# Patient Record
Sex: FEMALE | Race: BLACK OR AFRICAN AMERICAN
[De-identification: names, ages, dates, MRNs, and addresses within clinical notes are randomized per-mention and may not be internally consistent; named-entity substitution may affect disease eponyms.]

---

## 2019-02-22 NOTE — PRE-PROCEDURE NOTE/ATTESTATION
Pre-Procedure Note/Attestation


Complete Prior to Procedure


Planned Procedure:  right


Procedure Narrative:


Cataract extraction With Intraocular Lens Implant Right Eye





Indications for Procedure


Pre-Operative Diagnosis:


Cortical Cataract Right Eye





Attestation


I attest that I discussed the nature of the procedure; its benefits; risks and 

complications; and alternatives (and the risks and benefits of such alternatives

), prior to the procedure, with the patient (or the patient's legal 

representative).





I attest that, if there was a reasonable possibility of needing a blood 

transfusion, the patient (or the patient's legal representative) was given the 

[facility] standardized written summary, pursuant 

to the Christiano Ryan Blood Safety Act ([place] Health and Safety Code # 1645, as 

amended).





I attest that I re-evaluated the patient just prior to the surgery and that 

there has been no change in the patient's H&P, except as documented below:











Herbert Watt MD Feb 22, 2019 14:45

## 2019-02-22 NOTE — OPTHALMOLOGY H&P
Ophthalmology H&P


H&P


Chief Complaint:  decreased vision in right eye





HPI


Vision Affects Ability to:  read, manage personal affairs


HPI Narrative


Blurry Vision





Exam


Visual Acuity:  OD20/100       OS20/40


Tension:  OD  14            OS 14


Eye Exam:  normal OU: external exam, palpebral fissure-width, marginal reflex 

distance, levator function, corneas, anterior chambers, lens - Cortical 

Cataract OU, fundus exam; findings: lens - Cortical Cataract OU, fundus exam





Assessment/Plan


Treatment Plan:  cataract extraction w/ lens implant


Goals of Treatment:  improvement of vision, enhance quality of life





Attestation


Attestation


The risks and benefits of the surgery as well as alternative procedures were 

explained to the patient in detail.











Herbert Watt MD Feb 22, 2019 14:50

## 2019-02-25 ENCOUNTER — HOSPITAL ENCOUNTER (OUTPATIENT)
Dept: HOSPITAL 72 - SUR | Age: 64
Discharge: HOME | End: 2019-02-25
Payer: MEDICARE

## 2019-02-25 VITALS — SYSTOLIC BLOOD PRESSURE: 118 MMHG | DIASTOLIC BLOOD PRESSURE: 73 MMHG

## 2019-02-25 VITALS — DIASTOLIC BLOOD PRESSURE: 63 MMHG | SYSTOLIC BLOOD PRESSURE: 114 MMHG

## 2019-02-25 VITALS — DIASTOLIC BLOOD PRESSURE: 62 MMHG | SYSTOLIC BLOOD PRESSURE: 114 MMHG

## 2019-02-25 VITALS — SYSTOLIC BLOOD PRESSURE: 134 MMHG | DIASTOLIC BLOOD PRESSURE: 76 MMHG

## 2019-02-25 VITALS — SYSTOLIC BLOOD PRESSURE: 119 MMHG | DIASTOLIC BLOOD PRESSURE: 66 MMHG

## 2019-02-25 VITALS — HEIGHT: 60 IN | BODY MASS INDEX: 20.62 KG/M2 | WEIGHT: 105 LBS

## 2019-02-25 VITALS — SYSTOLIC BLOOD PRESSURE: 128 MMHG | DIASTOLIC BLOOD PRESSURE: 65 MMHG

## 2019-02-25 VITALS — DIASTOLIC BLOOD PRESSURE: 71 MMHG | SYSTOLIC BLOOD PRESSURE: 120 MMHG

## 2019-02-25 VITALS — SYSTOLIC BLOOD PRESSURE: 125 MMHG | DIASTOLIC BLOOD PRESSURE: 63 MMHG

## 2019-02-25 VITALS — SYSTOLIC BLOOD PRESSURE: 118 MMHG | DIASTOLIC BLOOD PRESSURE: 64 MMHG

## 2019-02-25 DIAGNOSIS — K21.9: ICD-10-CM

## 2019-02-25 DIAGNOSIS — Z88.5: ICD-10-CM

## 2019-02-25 DIAGNOSIS — E78.5: ICD-10-CM

## 2019-02-25 DIAGNOSIS — I10: ICD-10-CM

## 2019-02-25 DIAGNOSIS — E11.9: ICD-10-CM

## 2019-02-25 DIAGNOSIS — Z88.0: ICD-10-CM

## 2019-02-25 DIAGNOSIS — E78.00: ICD-10-CM

## 2019-02-25 DIAGNOSIS — H25.011: Primary | ICD-10-CM

## 2019-02-25 DIAGNOSIS — I73.9: ICD-10-CM

## 2019-02-25 DIAGNOSIS — H91.93: ICD-10-CM

## 2019-02-25 PROCEDURE — 82962 GLUCOSE BLOOD TEST: CPT

## 2019-02-25 PROCEDURE — 66984 XCAPSL CTRC RMVL W/O ECP: CPT

## 2019-02-25 PROCEDURE — 94150 VITAL CAPACITY TEST: CPT

## 2019-02-25 RX ADMIN — CYCLOPENTOLATE HYDROCHLORIDE SCH DROP: 10 SOLUTION OPHTHALMIC at 10:24

## 2019-02-25 RX ADMIN — Medication SCH DROP: at 10:18

## 2019-02-25 RX ADMIN — Medication SCH DROP: at 10:30

## 2019-02-25 RX ADMIN — Medication SCH DROP: at 10:24

## 2019-02-25 RX ADMIN — TOBRAMYCIN SCH DROP: 3 SOLUTION OPHTHALMIC at 10:18

## 2019-02-25 RX ADMIN — PHENYLEPHRINE HYDROCHLORIDE SCH DROP: 100 SOLUTION/ DROPS OPHTHALMIC at 10:24

## 2019-02-25 RX ADMIN — DICLOFENAC SODIUM SCH DROP: 1 SOLUTION/ DROPS OPHTHALMIC at 10:18

## 2019-02-25 RX ADMIN — TOBRAMYCIN SCH DROP: 3 SOLUTION OPHTHALMIC at 10:30

## 2019-02-25 RX ADMIN — PHENYLEPHRINE HYDROCHLORIDE SCH DROP: 100 SOLUTION/ DROPS OPHTHALMIC at 10:30

## 2019-02-25 RX ADMIN — PHENYLEPHRINE HYDROCHLORIDE SCH DROP: 100 SOLUTION/ DROPS OPHTHALMIC at 10:18

## 2019-02-25 RX ADMIN — CYCLOPENTOLATE HYDROCHLORIDE SCH DROP: 10 SOLUTION OPHTHALMIC at 10:18

## 2019-02-25 RX ADMIN — DICLOFENAC SODIUM SCH DROP: 1 SOLUTION/ DROPS OPHTHALMIC at 10:24

## 2019-02-25 RX ADMIN — DICLOFENAC SODIUM SCH DROP: 1 SOLUTION/ DROPS OPHTHALMIC at 10:30

## 2019-02-25 RX ADMIN — CYCLOPENTOLATE HYDROCHLORIDE SCH DROP: 10 SOLUTION OPHTHALMIC at 10:30

## 2019-02-25 RX ADMIN — TOBRAMYCIN SCH DROP: 3 SOLUTION OPHTHALMIC at 10:24

## 2019-02-25 NOTE — IMMEDIATE POST-OP EVALUATION
Immediate Post-Op Evalulation


Immediate Post-Op Evalulation


Procedure:  cataract extraction right eye


Date of Evaluation:  Feb 25, 2019


Time of Evaluation:  12:03


Blood Pressure Systolic:  140


Blood Pressure Diastolic:  50


Pulse Rate:  69


Respiratory Rate:  14


O2 Sat by Pulse Oximetry:  100


Temperature (Fahrenheit):  97.6


Nausea:  No


Vomiting:  No


Patient Status:  awake, reacts, patent


Hydration Status:  adequate


Drug:  none











FionariJordyn grace CRNA Feb 25, 2019 12:03

## 2019-02-25 NOTE — 48 HOUR POST ANESTHESIA EVAL
Post Anesthesia Evaluation


Procedure:  cataract extraction right eye


Date of Evaluation:  Feb 25, 2019


Time of Evaluation:  13:00


Blood Pressure Systolic:  130


0:  70


Pulse Rate:  65


Respiratory Rate:  14


O2 Sat by Pulse Oximetry:  98


Airway:  patent


Nausea:  No


Vomiting:  No


Hydration Status:  adequate


Cardiopulmonary Status:


stable


Mental Status/LOC:  patient returned to baseline


Post-Anesthesia Complications:


none


Follow-up care needed:  N/A











Jordyn Molina CRNA Feb 25, 2019 13:01

## 2019-02-27 NOTE — BRIEF OPERATIVE NOTE
Immediate Post Operative Note


Operative Note


Chief Complaint:  blurry vision


Pre-op Diagnosis:


Cortical Cataract Right Eye


Procedure:


phaco with IOL, OD


Post-op Diagnosis:


Pseudophakia


Post-op Diagnosis:  same as pre-op


Surgeon:  Alysa


Anesthesiologist:  Tracy


Anesthesia:  MAC


Specimen:  none


Complications:  none


Condition:  stable


Fluids:  LR


Estimated Blood Loss:  none


Drains:  none


Implant(s) used?:  Yes











Herbert Watt MD Feb 27, 2019 10:35

## 2019-02-27 NOTE — OPERATIVE NOTE - PDOC
Operative Note


Operative Note


Date of Operation/Procedure:  Feb 25, 2019


Chief Complaint:  blurry vision


Pre-op Diagnosis:


Cortical Cataract Right Eye


Procedure:


phaco with IOL, OD


Post-op Diagnosis:


Pseudophakia


Post-op Diagnosis:  same as pre-op


Surgeon:  Alysa


Anesthesiologist:  Tracy


Anesthesia:  MAC


Specimen:  none


Complications:  none


Condition:  stable


Fluids:  LR


Estimated Blood Loss:  none


Drains:  none


Implant(s) used?:  Yes


Indications for Procedure


cataract


Description of Procedure


This patient has been complaining visually significant cataract in the affected 

eye with the best corrected visual acuity under moderate glare conditions 

worse. The patient complains of difficulties with glare in performing 

activities of daily living and wants to manage personal affairs with comfort 

and accuracy and see well enough to move with safety at home and outdoors.


    


The risks, benefits and alternatives of the procedure were discussed with the 

patient in the office prior to scheduling surgery. All questions from the 

patient were answered after the surgical procedure was explained in detail. The 

risks of the procedure as explained to the patient include, but are not limited 

to, pain, infection, bleeding, loss of vision, retinal detachment, need for 

further surgery, loss of lens nucleus, double vision, etc. Alternative 

procedures were discussed which include, to do nothing or seek a second 

opinion. Informed consent for this procedure was obtained from the patient. The 

patient was referred to a primary care physician for a cardiopulmonary 

clearance prior to surgery, after proper evaluation was done patient was 

properly scheduled for outpatient surgery.


The patient was brought to the operating room where the anesthesiologist 

established I.V. lines and cardiac monitoring leads. Mild intravenous sedation 

was administered.   The patient was then prepared with a 5% solution of povidone

-iodine to the conjunctival fornix and lashes, and a  5% solution of povidone-

iodine to the lids and periorbital skin. The patient was then draped in the 

usual sterile fashion. 


A lid speculum was then placed in the operative eye. A keratome blade was then 

used to create a biplanar incision into the anterior chamber. Viscoelastics was 

then instilled into the anterior chamber. A 3-mm single pass clear corneal 

incision was made just anterior to the vascular arcade of the temporal limbus 

using a keratome. Anterior capsulorrhexis was created. The nucleus was 

hydrodissected and hydrodelineated, and was freely movable in the capsular bag. 

The nucleus was then phacoemulsified using a quadrantic divide-and-conquer 

technique. Following the deep groove formation, the lens was split bimanually 

and the resultant quadrants and cortical material was removed under vacuum burst

-mode phacoemulsification. Peripheral cortex was removed with the irrigation 

and aspiration handpiece.  The capsular bag was expanded with viscoelastic. The 

intraocular lens was then inspected for right  power and size  and thought to 

be satisfactory. The implant was inspected under the microscope and found to be 

free of defects. The implant was inserted into the cartridge system under 

viscoelastic and placed in the capsular bag. The trailing haptic was positioned 

with the cartridge system.


Viscoelastics  was removed from the anterior chamber using the irrigation and 

aspiration unit. The corneal wound was then tested for leaks and none were 

found. The lid speculum were then removed. Sponge and needle counts were 

correct. An eye patch and shield were placed over the operative eye. The 

patient was taken to the recovery room in stable condition. There were no 

complications. The patient tolerated the procedure well. The patient was then 

transferred to the ambulatory surgery unit in stable and satisfactory condition

, was given detailed written instructions and asked to follow up  in the office 

the next day.











Herbert Watt MD Feb 27, 2019 10:36